# Patient Record
Sex: MALE | Race: OTHER
[De-identification: names, ages, dates, MRNs, and addresses within clinical notes are randomized per-mention and may not be internally consistent; named-entity substitution may affect disease eponyms.]

---

## 2018-01-15 ENCOUNTER — HOSPITAL ENCOUNTER (EMERGENCY)
Dept: HOSPITAL 54 - ER | Age: 63
Discharge: ANOTHER HOSPITAL | End: 2018-01-15
Payer: COMMERCIAL

## 2018-01-15 VITALS — SYSTOLIC BLOOD PRESSURE: 110 MMHG | DIASTOLIC BLOOD PRESSURE: 79 MMHG

## 2018-01-15 VITALS — BODY MASS INDEX: 32.93 KG/M2 | HEIGHT: 70 IN | WEIGHT: 230 LBS

## 2018-01-15 DIAGNOSIS — F10.121: ICD-10-CM

## 2018-01-15 DIAGNOSIS — R45.851: ICD-10-CM

## 2018-01-15 DIAGNOSIS — R07.89: Primary | ICD-10-CM

## 2018-01-15 LAB
ALBUMIN SERPL BCP-MCNC: 4 G/DL (ref 3.4–5)
ALP SERPL-CCNC: 80 U/L (ref 46–116)
ALT SERPL W P-5'-P-CCNC: 33 U/L (ref 12–78)
APAP SERPL-MCNC: 0 UG/ML (ref 10–30)
APTT PPP: 24 SEC (ref 23–34)
AST SERPL W P-5'-P-CCNC: 21 U/L (ref 15–37)
BASOPHILS # BLD AUTO: 0.1 /CMM (ref 0–0.2)
BASOPHILS NFR BLD AUTO: 1.3 % (ref 0–2)
BILIRUB DIRECT SERPL-MCNC: 0.1 MG/DL (ref 0–0.2)
BILIRUB SERPL-MCNC: 0.3 MG/DL (ref 0.2–1)
BUN SERPL-MCNC: 7 MG/DL (ref 7–18)
CALCIUM SERPL-MCNC: 9.1 MG/DL (ref 8.5–10.1)
CHLORIDE SERPL-SCNC: 109 MMOL/L (ref 98–107)
CO2 SERPL-SCNC: 26 MMOL/L (ref 21–32)
CREAT SERPL-MCNC: 1.1 MG/DL (ref 0.6–1.3)
EOSINOPHIL # BLD AUTO: 0.1 /CMM (ref 0–0.7)
EOSINOPHIL NFR BLD AUTO: 0.8 % (ref 0–6)
ETHANOL SERPL-MCNC: 222 MG/DL (ref 0–0)
GLUCOSE SERPL-MCNC: 152 MG/DL (ref 74–106)
HCT VFR BLD AUTO: 48 % (ref 39–51)
HGB BLD-MCNC: 16.3 G/DL (ref 13.5–17.5)
INR PPP: 0.94 (ref 0.87–1.13)
LYMPHOCYTES NFR BLD AUTO: 1.8 /CMM (ref 0.8–4.8)
LYMPHOCYTES NFR BLD AUTO: 17.3 % (ref 20–44)
MCH RBC QN AUTO: 31 PG (ref 26–33)
MCHC RBC AUTO-ENTMCNC: 34 G/DL (ref 31–36)
MCV RBC AUTO: 93 FL (ref 80–96)
MONOCYTES NFR BLD AUTO: 0.4 /CMM (ref 0.1–1.3)
MONOCYTES NFR BLD AUTO: 4.2 % (ref 2–12)
NEUTROPHILS # BLD AUTO: 8.1 /CMM (ref 1.8–8.9)
NEUTROPHILS NFR BLD AUTO: 76.4 % (ref 43–81)
PLATELET # BLD AUTO: 252 /CMM (ref 150–450)
POTASSIUM SERPL-SCNC: 4 MMOL/L (ref 3.5–5.1)
PROT SERPL-MCNC: 7.9 G/DL (ref 6.4–8.2)
PROTHROMBIN TIME: 9.8 SECS (ref 9.5–12.7)
RBC # BLD AUTO: 5.21 MIL/UL (ref 4.5–6)
RDW COEFFICIENT OF VARIATION: 14.4 (ref 11.5–15)
SALICYLATES SERPL-MCNC: 6.2 MG/DL (ref 2.8–20)
SODIUM SERPL-SCNC: 146 MMOL/L (ref 136–145)
TROPONIN I SERPL-MCNC: < 0.017 NG/ML (ref 0–0.06)
WBC NRBC COR # BLD AUTO: 10.6 K/UL (ref 4.3–11)

## 2018-01-15 PROCEDURE — Z7610: HCPCS

## 2018-01-15 PROCEDURE — G0480 DRUG TEST DEF 1-7 CLASSES: HCPCS

## 2018-01-15 PROCEDURE — A4606 OXYGEN PROBE USED W OXIMETER: HCPCS

## 2018-01-15 PROCEDURE — A6402 STERILE GAUZE <= 16 SQ IN: HCPCS

## 2018-01-15 NOTE — NUR
PT RECEIVED FROM [facility] W/ POLICE SMART TEAM D/T PATIENT C/O SI WITH INTENT AND 
PLACED ON 5150. EN ROUTE TO [facility] THE PATIENT C/O CHEST PAIN NONRADIATING 
8/10 "CRUSHING". NO SOB NOTED AT THIS TIME. A/0X3 VSS NAD. PT ARRIVED IN 
CUSTODY WITH HANDCUFF ON AYAH KENT MD PLACED ORDER FOR RESTRAINTS HERE AS 
WELL. WILL CONTINUE TO MONITOR FOR ANY CHANGES

## 2018-01-15 NOTE — NUR
REPORT GIVEN TO TRANSPORTATION TO [facility]. D/C PAPERWORK AS WELL AS 
COPIES OF CARE PROVIDED INCLUDED. CD FOR XRAY INCLUDED

## 2018-01-15 NOTE — NUR
PT TBA [facility]. CALL REPORT TO [phone]. ADMITTING DOCTOR 
WILL BE DR LITTLEJOHN.